# Patient Record
Sex: FEMALE | Race: WHITE | HISPANIC OR LATINO
[De-identification: names, ages, dates, MRNs, and addresses within clinical notes are randomized per-mention and may not be internally consistent; named-entity substitution may affect disease eponyms.]

---

## 2022-07-11 ENCOUNTER — APPOINTMENT (OUTPATIENT)
Dept: ORTHOPEDIC SURGERY | Facility: CLINIC | Age: 57
End: 2022-07-11

## 2022-07-11 DIAGNOSIS — E78.00 PURE HYPERCHOLESTEROLEMIA, UNSPECIFIED: ICD-10-CM

## 2022-07-11 DIAGNOSIS — S92.341A DISPLACED FRACTURE OF FOURTH METATARSAL BONE, RIGHT FOOT, INITIAL ENCOUNTER FOR CLOSED FRACTURE: ICD-10-CM

## 2022-07-11 DIAGNOSIS — S92.351A DISPLACED FRACTURE OF FIFTH METATARSAL BONE, RIGHT FOOT, INITIAL ENCOUNTER FOR CLOSED FRACTURE: ICD-10-CM

## 2022-07-11 NOTE — PHYSICAL EXAM
[Moderate] : moderate swelling of lateral foot [4th] : 4th [NL (40)] : plantar flexion 40 degrees [5___] : dorsiflexion 5[unfilled]/5 [4___] : inversion 4[unfilled]/5 [3___] : eversion 3[unfilled]/5 [2+] : posterior tibialis pulse: 2+ [Normal] : saphenous nerve sensation normal [Right] : right foot [] : non-antalgic [de-identified] : Comminuted 4/5 metatarsal fractures in acceptable alignment; no change from prior xray. [de-identified] : inversion 20 degrees [de-identified] : eversion 15 degrees [TWNoteComboBox7] : dorsiflexion 15 degrees

## 2022-07-11 NOTE — HISTORY OF PRESENT ILLNESS
[de-identified] : Pt is a 56 year old female who twisted her right foot on 7/10/22.  She went to [facility] MD and was diagnosed with 4th/5th metatarsal fractures.  She has been pwb in a post op shoe with crutches since.  No prior right foot problems. [] : Post Surgical Visit: no [FreeTextEntry1] : R foot

## 2022-07-11 NOTE — ASSESSMENT
[FreeTextEntry1] : Patient was told that she has fractures.\par She is to strictly nwb in a cam walker.  Rx was given today.\par Ice/nsaids prn.\par Possible need for surgery was discussed if alignment changes.

## 2022-07-11 NOTE — DATA REVIEWED
[Outside X-rays] : outside x-rays [Right] : of the right [Foot] : foot [I reviewed the films/CD] : I reviewed the films/CD [FreeTextEntry1] : Comminuted fractures of 4/5 metatarsals.  Acceptable displacement noted.

## 2022-07-11 NOTE — RETURN TO WORK/SCHOOL
[FreeTextEntry1] : The patient can return to work on 7/14/22.  She is not permitted to drive.  She is not allowed to bear weight on the right foot.

## 2022-07-18 ENCOUNTER — APPOINTMENT (OUTPATIENT)
Dept: ORTHOPEDIC SURGERY | Facility: CLINIC | Age: 57
End: 2022-07-18

## 2022-07-18 DIAGNOSIS — Z00.00 ENCOUNTER FOR GENERAL ADULT MEDICAL EXAMINATION W/OUT ABNORMAL FINDINGS: ICD-10-CM

## 2022-07-18 NOTE — ASSESSMENT
[FreeTextEntry1] : Fractures are stable.\par No surgical indication at this time.\par Continue NWB in CAM boot, the importance of compliance stressed to patient.\par \par Patient was instructed that they can not operate an automatic vehicle while wearing a CAM boot or cast on the right lower extremity. If operating a vehicle that requires use of a clutch, patient may not drive while wearing a CAM boot or cast on the left lower extremity.\par \par Repeat x-ray will be performed at the next office visit.\par \par

## 2022-07-18 NOTE — HISTORY OF PRESENT ILLNESS
[de-identified] : Pt. presents for follow up of her right 4th/5th metatarsal fractures from 7/10/22.  She has been nwb in cam boot, using roll-about scooter.  [] : Post Surgical Visit: no [FreeTextEntry1] : R foot

## 2022-07-18 NOTE — PHYSICAL EXAM
[4th] : 4th [NL (40)] : plantar flexion 40 degrees [2+] : posterior tibialis pulse: 2+ [Normal] : saphenous nerve sensation normal [Right] : right foot [Mild] : mild swelling of lateral foot [5th] : 5th [NL (20)] : dorsiflexion 20 degrees [NL 30)] : inversion 30 degrees [5___] : inversion 5[unfilled]/5 [4___] : eversion 4[unfilled]/5 [] : no pain when stressing lateral tarsal metatarsal joint [de-identified] : Comminuted 4/5 metatarsal fractures in acceptable alignment; no change from prior xray. [de-identified] : inversion 20 degrees [de-identified] : eversion 15 degrees [TWNoteComboBox7] : dorsiflexion 15 degrees

## 2022-08-01 ENCOUNTER — TRANSCRIPTION ENCOUNTER (OUTPATIENT)
Age: 57
End: 2022-08-01

## 2022-08-01 ENCOUNTER — APPOINTMENT (OUTPATIENT)
Dept: ORTHOPEDIC SURGERY | Facility: CLINIC | Age: 57
End: 2022-08-01

## 2022-08-01 NOTE — ASSESSMENT
[FreeTextEntry1] : Fractures are stable.\par No surgical indication at this time.\par Continue NWB in CAM boot, the importance of compliance stressed to patient.\par Ice/nsaids prn. \par \par \par Repeat x-ray will be performed at the next office visit.\par \par

## 2022-08-01 NOTE — PHYSICAL EXAM
[Mild] : mild swelling of lateral foot [5th] : 5th [NL (40)] : plantar flexion 40 degrees [NL 30)] : inversion 30 degrees [2+] : posterior tibialis pulse: 2+ [Normal] : saphenous nerve sensation normal [Right] : right foot [NL (20)] : eversion 20 degrees [5___] : eversion 5[unfilled]/5 [] : no pain when stressing lateral tarsal metatarsal joint [The fracture is in acceptable alignment. There is progression in healing seen] : The fracture is in acceptable alignment. There is progression in healing seen [de-identified] : Comminuted 4/5 metatarsal fractures in acceptable alignment; no change in alignment from prior xray.  Mild increase in healing. [de-identified] : eversion 0 degrees

## 2022-08-01 NOTE — HISTORY OF PRESENT ILLNESS
[de-identified] : Pt. presents for follow up of her right 4th/5th metatarsal fractures from 7/10/22.  She has been nwb in cam boot, using roll-about scooter.   She admits to falling on that foot on 7/20/22.  She has no pain at this time. [] : Post Surgical Visit: no [FreeTextEntry1] : R foot

## 2022-08-22 ENCOUNTER — APPOINTMENT (OUTPATIENT)
Dept: ORTHOPEDIC SURGERY | Facility: CLINIC | Age: 57
End: 2022-08-22

## 2022-08-22 VITALS — HEIGHT: 65 IN | BODY MASS INDEX: 24.99 KG/M2 | WEIGHT: 150 LBS

## 2022-08-22 NOTE — PHYSICAL EXAM
[Mild] : mild swelling of lateral foot [NL (40)] : plantar flexion 40 degrees [NL 30)] : inversion 30 degrees [NL (20)] : eversion 20 degrees [5___] : eversion 5[unfilled]/5 [2+] : posterior tibialis pulse: 2+ [Normal] : saphenous nerve sensation normal [Right] : right foot [The fracture is in acceptable alignment. There is progression in healing seen] : The fracture is in acceptable alignment. There is progression in healing seen [] : no pain when stressing lateral tarsal metatarsal joint [de-identified] : Comminuted 4/5 metatarsal fractures in acceptable alignment; no change in alignment from prior xray.

## 2022-08-22 NOTE — HISTORY OF PRESENT ILLNESS
[0] : 0 [de-identified] : Pt. presents for follow up of her right 4th/5th metatarsal fractures from 7/10/22.  She has been nwb in cam boot, using roll-about scooter.   She reports doing better. No pain at this time. [] : Post Surgical Visit: no [FreeTextEntry1] : R foot

## 2022-09-16 ENCOUNTER — APPOINTMENT (OUTPATIENT)
Dept: ORTHOPEDIC SURGERY | Facility: CLINIC | Age: 57
End: 2022-09-16

## 2022-09-16 NOTE — HISTORY OF PRESENT ILLNESS
[0] : 0 [de-identified] : Pt. presents for follow up of her right 4th/5th metatarsal fractures from 7/10/22.  She has been intermittently wb in cam boot, using roll-about scooter.   She reports no pain at this time. [] : Post Surgical Visit: no [FreeTextEntry1] : R foot

## 2022-09-16 NOTE — PHYSICAL EXAM
[NL (40)] : plantar flexion 40 degrees [NL 30)] : inversion 30 degrees [NL (20)] : eversion 20 degrees [2+] : posterior tibialis pulse: 2+ [Normal] : saphenous nerve sensation normal [Right] : right foot [The fracture is in acceptable alignment. There is progression in healing seen] : The fracture is in acceptable alignment. There is progression in healing seen [5___] : [facility] 5[unfilled]/5 [] : no pain when stressing lateral tarsal metatarsal joint [de-identified] : Comminuted 4/5 metatarsal fractures in acceptable alignment; no change in alignment from prior xray.  4th metatarsal is healed; 5th is improved

## 2022-09-16 NOTE — ASSESSMENT
[FreeTextEntry1] : Patient is doing well and no longer needs the cam walker\par No high impact activities\par Ice/nsaids prn\par \par Repeat x-ray will be performed at the next office visit.\par \par

## 2022-10-14 ENCOUNTER — APPOINTMENT (OUTPATIENT)
Dept: ORTHOPEDIC SURGERY | Facility: CLINIC | Age: 57
End: 2022-10-14

## 2022-10-14 DIAGNOSIS — S92.351D DISPLACED FRACTURE OF FIFTH METATARSAL BONE, RIGHT FOOT, SUBSEQUENT ENCOUNTER FOR FRACTURE WITH ROUTINE HEALING: ICD-10-CM

## 2022-10-14 DIAGNOSIS — S92.341D DISPLACED FRACTURE OF FOURTH METATARSAL BONE, RIGHT FOOT, SUBSEQUENT ENCOUNTER FOR FRACTURE WITH ROUTINE HEALING: ICD-10-CM

## 2022-10-14 NOTE — HISTORY OF PRESENT ILLNESS
[0] : 0 [de-identified] : Pt. presents for follow up of her right 4th/5th metatarsal fractures from 7/10/22.  She has been wb in supportive shoes.  No real pain at this time.  [] : Post Surgical Visit: no [FreeTextEntry1] : R foot

## 2022-10-14 NOTE — ASSESSMENT
[FreeTextEntry1] : Fractures are healed.\par Patient has recovered well, and can slowly resume activities as tolerated.  Home stretching exercises were encouraged to maintain flexibility. Ice/nsaids can be used as needed. Patient will up as needed.\par \par \par

## 2022-10-14 NOTE — PHYSICAL EXAM
[NL (40)] : plantar flexion 40 degrees [NL 30)] : inversion 30 degrees [NL (20)] : eversion 20 degrees [5___] : [facility] 5[unfilled]/5 [2+] : posterior tibialis pulse: 2+ [Normal] : saphenous nerve sensation normal [Right] : right foot [The fracture is in acceptable alignment. There is progression in healing seen] : The fracture is in acceptable alignment. There is progression in healing seen [] : non-antalgic [de-identified] : Healed 4th and 5th metatarsal fractures.

## 2023-04-26 ENCOUNTER — NON-APPOINTMENT (OUTPATIENT)
Age: 58
End: 2023-04-26

## 2023-04-28 ENCOUNTER — NON-APPOINTMENT (OUTPATIENT)
Age: 58
End: 2023-04-28

## 2023-04-28 ENCOUNTER — APPOINTMENT (OUTPATIENT)
Dept: ORTHOPEDIC SURGERY | Facility: CLINIC | Age: 58
End: 2023-04-28
Payer: COMMERCIAL

## 2023-04-28 VITALS — WEIGHT: 154 LBS | BODY MASS INDEX: 25.66 KG/M2 | HEIGHT: 65 IN

## 2023-04-28 DIAGNOSIS — M25.512 PAIN IN LEFT SHOULDER: ICD-10-CM

## 2023-04-28 DIAGNOSIS — G89.29 PAIN IN RIGHT SHOULDER: ICD-10-CM

## 2023-04-28 DIAGNOSIS — G89.29 PAIN IN LEFT SHOULDER: ICD-10-CM

## 2023-04-28 DIAGNOSIS — M25.511 PAIN IN RIGHT SHOULDER: ICD-10-CM

## 2023-04-28 RX ORDER — EVOLOCUMAB 140 MG/ML
140 INJECTION, SOLUTION SUBCUTANEOUS
Refills: 0 | Status: ACTIVE | COMMUNITY

## 2023-04-28 NOTE — PHYSICAL EXAM
[de-identified] : General Exam\par \par Well developed, well nourished\par No apparent distress\par Oriented to person, place, and time\par Mood: Normal\par Affect: Normal\par Balance and coordination: Normal\par Gait: Normal\par \par Left shoulder exam\par \par Inspection: No swelling, ecchymosis or gross deformity.\par Skin: No masses, No lesions\par Tenderness: No bicipital tenderness, no tenderness to the greater tuberosity/RTC insertion, no anterior shoulder/lesser tuberosity tenderness. No tenderness SC joint, clavicle, AC joint.\par ROM: 160/60/T6\par Impingement tests: Positive Willson\par AC Joint: no pain with cross arm testing\par Biceps: Negative speed\par Strength: 5/5 abduction, external rotation, and internal rotation\par Neuro: AIN, PIN, Ulnar nerve motor intact\par Sensation: Intact to light touch in radial, median, ulnar, and axillary nerve distributions\par Vasc: 2+ radial pulse\par \par Right shoulder exam\par \par Inspection: No swelling, ecchymosis or gross deformity.\par Skin: No masses, No lesions\par Tenderness: No bicipital tenderness, no tenderness to the greater tuberosity/RTC insertion, no anterior shoulder/lesser tuberosity tenderness. No tenderness SC joint, clavicle, AC joint.\par ROM: 160/60/T6\par Impingement tests: Positive Willson\par AC Joint: no pain with cross arm testing\par Biceps: Negative speed\par Strength: 5/5 abduction, external rotation, and internal rotation\par Neuro: AIN, PIN, Ulnar nerve motor intact\par Sensation: Intact to light touch in radial, median, ulnar, and axillary nerve distributions\par Vasc: 2+ radial pulse\par  [de-identified] : \par The following radiographs were ordered and read by me during this patients visit. I reviewed each radiograph in detail with the patient and discussed the findings as highlighted below. \par 3 views both shoulders] were obtained today that show no fracture, dislocation. There is no degenerative change seen. There is no malalignment. No obvious osseous abnormality. Otherwise unremarkable.

## 2023-04-28 NOTE — DISCUSSION/SUMMARY
[de-identified] : Bilateral shoulder impingement rotator cuff tendinopathy.  We discussed diagnostic and treatment options at length.  She requested injections today\par \par Injection: Right shoulder (Subacromial).\par Indication: Impingement.\par \par A discussion was had with the patient regarding this procedure and all questions were answered. All risks, benefits and alternatives were discussed. These included but were not limited to bleeding, infection, and allergic reaction. Alcohol was used to clean the skin, and betadine was used to sterilize and prep the area in the posterior aspect of the right shoulder. Ethyl chloride spray was then used as a topical anesthetic. A 21-gauge needle was used to inject 4cc of 1% lidocaine and 1cc of 40mg/ml methylprednisolone into the right subacromial space. A sterile bandage was then applied. The patient tolerated the procedure well and there were no complications. \par \par Injection: Left shoulder (Subacromial).\par Indication: Impingement.\par \par A discussion was had with the patient regarding this procedure and all questions were answered. All risks, benefits and alternatives were discussed. These included but were not limited to bleeding, infection, and allergic reaction. Alcohol was used to clean the skin, and betadine was used to sterilize and prep the area in the posterior aspect of the left shoulder. Ethyl chloride spray was then used as a topical anesthetic. A 21-gauge needle was used to inject 4cc of 1% lidocaine and 1cc of 40mg/ml methylprednisolone into the left subacromial space. A sterile bandage was then applied. The patient tolerated the procedure well and there were no complications. \par \par Physical therapy home exercise program follow-up as needed.  If not improved consider advanced imaging with MRI.

## 2023-04-28 NOTE — HISTORY OF PRESENT ILLNESS
[de-identified] : Pt. is a 57 yr old F with Bilateral chronic right worse than left shoulder pain for 2 years. Pt. denies any traumatic FALLON. Pt. states she experiences occasional tingling from her shoulder to the elbow. Pt. states her shoulder pain in deep in the joint. Pt. has difficulty reaching above her head and behind her back. Pt. came in to discuss treatment options. \par \par The patient's past medical history, past surgical history, medications, allergies, and social history were reviewed by me today with the patient and documented accordingly. In addition, the patient's family history, which is noncontributory to this visit, was also reviewed.\par

## 2023-08-02 ENCOUNTER — APPOINTMENT (OUTPATIENT)
Dept: ORTHOPEDIC SURGERY | Facility: CLINIC | Age: 58
End: 2023-08-02
Payer: COMMERCIAL

## 2023-08-02 VITALS — HEIGHT: 65 IN | BODY MASS INDEX: 26.49 KG/M2 | WEIGHT: 159 LBS

## 2023-08-02 DIAGNOSIS — M77.12 LATERAL EPICONDYLITIS, RIGHT ELBOW: ICD-10-CM

## 2023-08-02 DIAGNOSIS — M77.11 LATERAL EPICONDYLITIS, RIGHT ELBOW: ICD-10-CM

## 2023-08-02 RX ORDER — MELOXICAM 7.5 MG/1
7.5 TABLET ORAL DAILY
Qty: 30 | Refills: 0 | Status: ACTIVE | COMMUNITY
Start: 2023-08-02 | End: 1900-01-01

## 2023-08-02 NOTE — HISTORY OF PRESENT ILLNESS
[de-identified] : Pt. is a 57 yr old F with Bilateral chronic elbow pain, aggravated three weeks ago after doing lots of household chores. She was initially seen in April 2023 for bilateral shoulders.  She describes Left> Right lateral elbow pain.  She has shooting pain down the left arm.  Denies numbness or tingling in the fingers.   Pt. denies any traumatic FALLON. Pt. states she experiences occasional tingling from her shoulder to the elbow. Pt. states her shoulder pain in deep in the joint. Pt. has difficulty reaching above her head and behind her back. Pt. came in to discuss treatment options.   The patient's past medical history, past surgical history, medications, allergies, and social history were reviewed by me today with the patient and documented accordingly. In addition, the patient's family history, which is noncontributory to this visit, was also reviewed.

## 2023-08-02 NOTE — DISCUSSION/SUMMARY
[de-identified] : Bilateral lateral epicondylitis I discussed with the patient the treatment of lateral epicondylitis. I discussed that this is a degenerative condition rather than an inflammatory process. The process is reversible, and the best source of treatment is to reduce the offending repetitive overuse injury process. I counseled the patient how to do this. In addition, treatment includes counterforce bracing, physical therapy for stretching and strengthening, and the use of injection therapy (steroids/PRP etc). I also discussed the role of surgical intervention for may become a chronic condition.  Given prescription for Mobic side effects discussed Physical therapy home exercise program follow-up as needed.  If not improved consider advanced imaging with MRI.

## 2023-08-02 NOTE — PHYSICAL EXAM
[de-identified] : General Exam  Well developed, well nourished No apparent distress Oriented to person, place, and time Mood: Normal Affect: Normal Balance and coordination: Normal Gait: Normal  Bilateral elbow exam  ·	Skin: Clean, dry, intact. No ecchymosis. No swelling. No palpable joint effusion. No gross deformity. ·	Tenderness: No tenderness to palpation medial epicondyle, + ttp lateral epicondyle, no tenderness olecranon, radial head. ·	ROM: 0 to 140 degrees bilateral full pronation full supination.  Pain with wrist and middle finger extension bilateral ·	Stability: Stable to vaus/valgus stress ·	Neuro: Negative tinels at ulnar canal, AIN/PIN/Ulnar nerve in tact to motor/sensation. ·	Strength: 5/5 elbow flexion, 5/5 elbow extension, 5/5 supination, 5/5 pronation ·	Sensation: In tact to light touch throughout ·	Vasc: 2+ radial pulse, <2s cap refill  [de-identified] : The following radiographs were ordered and read by me during this patients visit. I reviewed each radiograph in detail with the patient and discussed the findings as highlighted below.  3 views both elbows were obtained today that show no fracture, dislocation. There is no degenerative change seen. There is no malalignment. No obvious osseous abnormality. Otherwise unremarkable.

## 2024-01-29 ENCOUNTER — APPOINTMENT (OUTPATIENT)
Dept: ORTHOPEDIC SURGERY | Facility: CLINIC | Age: 59
End: 2024-01-29
Payer: COMMERCIAL

## 2024-01-29 VITALS — HEIGHT: 65 IN | BODY MASS INDEX: 26.49 KG/M2 | WEIGHT: 159 LBS

## 2024-01-29 DIAGNOSIS — M77.12 LATERAL EPICONDYLITIS, LEFT ELBOW: ICD-10-CM

## 2024-01-29 NOTE — ASSESSMENT
[FreeTextEntry1] : The patient was advised of the diagnosis. The natural history of the pathology was explained in full to the patient in layman's terms. All questions were answered. The risks and benefits of surgical and non-surgical treatment alternatives were explained in full to the patient. We discussed treatment options including nsaids, topical gels, tennis elbow strap, PT, activity modification, cortisone inj, sx .pt is aware that symptoms usually resolve on their own in 95-99% of people, but the timeframe is unknown. Home exercises/stretches were demonstrated and the patient practiced them as well- They are to do the exercises hourly and hold the stretch for 30 seconds.  Avoid repetitive wrist flexion time was spent instructing the patient on appropriate placement of the elbow strap as well.   An injection of a tendon origin was performed of the left lateral epicondyle. The indication for this procedure was pain and inflammation. The site was prepped with alcohol and ethyl chloride sprayed topically. An injection of Lidocaine 3cc of 1% , Bupivacaine (Marcaine) 3cc of 0.25% , Triamcinolone (Kenalog) 2cc of 40 mg  was used. Patient was advised to call if redness, pain or fever occur and apply ice for 15 minutes out of every hour for the next 12-24 hours as tolerated. The risks benefits, and alternatives have been discussed, and verbal consent was obtained.  Pt was given CSI in left lateral epicondyle Start activity modification and HEP. F/u in 6 weeks

## 2024-01-29 NOTE — IMAGING
[de-identified] : full active range of motion of the left shoulder, wrist and fingers full active range of motion of the left elbow Tenderness to palpation of the lateral epicondyle and proximal extensor supinator mass pain with resisted wrist extension and forearm supination 4/5 strength in supination and wrist extension, otherwise 5/5 strength median/ulnar/radial sensation intact to light touch ain/pin/ulnar motor intact palpable pulses CR<2s

## 2024-01-29 NOTE — HISTORY OF PRESENT ILLNESS
[8] : 8 [Dull/Aching] : dull/aching [Constant] : constant [Nothing helps with pain getting better] : Nothing helps with pain getting better [de-identified] : 1/29/24:  Pt reports that she has had intermittent pain in her left elbow for about 2 years.  Pt was given CSI in left lateral epicondyle 6 months ago that alleviated the pain for 3 months. [] : no [FreeTextEntry1] : L elbow [FreeTextEntry5] : pain for a year. seen PM-CSI no relief [de-identified] : activity

## 2024-08-26 ENCOUNTER — APPOINTMENT (OUTPATIENT)
Dept: ORTHOPEDIC SURGERY | Facility: CLINIC | Age: 59
End: 2024-08-26
Payer: COMMERCIAL

## 2024-08-26 DIAGNOSIS — S92.512A DISPLACED FRACTURE OF PROXIMAL PHALANX OF LEFT LESSER TOE(S), INITIAL ENCOUNTER FOR CLOSED FRACTURE: ICD-10-CM

## 2024-08-26 RX ORDER — NAPROXEN 500 MG/1
500 TABLET ORAL
Qty: 60 | Refills: 2 | Status: ACTIVE | COMMUNITY
Start: 2024-08-26 | End: 1900-01-01

## 2024-08-26 NOTE — PHYSICAL EXAM
[Moderate] : moderate swelling of toe(s) [4th] : 4th [NL (40)] : plantar flexion 40 degrees [NL 30)] : inversion 30 degrees [NL (20)] : eversion 20 degrees [5___] : [facility] 5[unfilled]/5 [2+] : posterior tibialis pulse: 2+ [Normal] : saphenous nerve sensation normal [] : patient ambulates without assistive device [Left] : left foot [de-identified] : Mildly displaced 4th proximal phalanx fracture in good alignment.

## 2024-08-26 NOTE — DISCUSSION/SUMMARY
[de-identified] : Patient has an acceptably aligned toe fracture.  WBAT in hard sole shoe. Ice to affected area. Matthieu taping toes. Naprosyn prescribed. The patient was explained the options as well as benefits of over the counter verses prescription strength nonsteroidal anti-inflammatory medication. Prescription strength medication is recommended to suppress chronic inflammation. The patient opts for a prescription strength medication.  Repeat x-ray will be performed at the next office visit

## 2024-08-29 ENCOUNTER — APPOINTMENT (OUTPATIENT)
Dept: ORTHOPEDIC SURGERY | Facility: CLINIC | Age: 59
End: 2024-08-29
Payer: COMMERCIAL

## 2024-08-29 DIAGNOSIS — M77.12 LATERAL EPICONDYLITIS, LEFT ELBOW: ICD-10-CM

## 2024-08-29 NOTE — HISTORY OF PRESENT ILLNESS
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   1/29/24:  Pt reports that she has had intermittent pain in her left elbow for about 2 years.  Pt was given CSI in left lateral epicondyle 6 months ago that alleviated the pain for 3 months. [] : no [FreeTextEntry1] : L elbow [FreeTextEntry5] : pain for a year. seen PM-CSI no relief [de-identified] : activity

## 2024-08-29 NOTE — ASSESSMENT
[FreeTextEntry1] : The patient was advised of the diagnosis. The natural history of the pathology was explained in full to the patient in layman's terms. All questions were answered. The risks and benefits of surgical and non-surgical treatment alternatives were explained in full to the patient. We discussed treatment options including nsaids, topical gels, tennis elbow strap, PT, activity modification, cortisone inj, sx .pt is aware that symptoms usually resolve on their own in 95-99% of people, but the timeframe is unknown. Home exercises/stretches were demonstrated and the patient practiced them as well- They are to do the exercises hourly and hold the stretch for 30 seconds.  Avoid repetitive wrist flexion time was spent instructing the patient on appropriate placement of the elbow strap as well.   An injection of a tendon origin was performed of the left lateral epicondyle. The indication for this procedure was pain and inflammation. The site was prepped with alcohol and ethyl chloride sprayed topically. An injection of Lidocaine 3cc of 1% , Bupivacaine (Marcaine) 3cc of 0.25% , Triamcinolone (Kenalog) 2cc of 40 mg  was used. Patient was advised to call if redness, pain or fever occur and apply ice for 15 minutes out of every hour for the next 12-24 hours as tolerated. The risks benefits, and alternatives have been discussed, and verbal consent was obtained.

## 2024-08-29 NOTE — IMAGING
[de-identified] : full active range of motion of the left shoulder, wrist and fingers full active range of motion of the left elbow Tenderness to palpation of the lateral epicondyle and proximal extensor supinator mass pain with resisted wrist extension and forearm supination 4/5 strength in supination and wrist extension, otherwise 5/5 strength median/ulnar/radial sensation intact to light touch ain/pin/ulnar motor intact palpable pulses CR<2s

## 2024-09-23 ENCOUNTER — APPOINTMENT (OUTPATIENT)
Dept: ORTHOPEDIC SURGERY | Facility: CLINIC | Age: 59
End: 2024-09-23
Payer: COMMERCIAL

## 2024-09-23 VITALS — HEIGHT: 65 IN | BODY MASS INDEX: 26.49 KG/M2 | WEIGHT: 159 LBS

## 2024-09-23 DIAGNOSIS — S92.512D DISPLACED FRACTURE OF PROXIMAL PHALANX OF LEFT LESSER TOE(S), SUBSEQUENT ENCOUNTER FOR FRACTURE WITH ROUTINE HEALING: ICD-10-CM

## 2024-09-23 NOTE — DISCUSSION/SUMMARY
[de-identified] : Patient has an acceptably aligned toe fracture.  Continue with tavon taping and using a  hard sole shoe. The fifth toe is stable an does not need any additional treatment. Repeat x-ray will be performed at the next office visit

## 2024-09-23 NOTE — PHYSICAL EXAM
[4th] : 4th [NL (40)] : plantar flexion 40 degrees [NL 30)] : inversion 30 degrees [NL (20)] : eversion 20 degrees [5___] : [facility] 5[unfilled]/5 [2+] : posterior tibialis pulse: 2+ [Normal] : saphenous nerve sensation normal [Mild] : mild swelling of toe(s) [] : no pain when stressing lateral tarsal metatarsal joint [Left] : left toe [de-identified] : Mildly displaced 4th proximal phalanx fracture with some early callus. No change in alignment from last visit.  Non displaced 5th proximal phalanx fracture seen today which was not seen on prior xray.

## 2024-09-23 NOTE — HISTORY OF PRESENT ILLNESS
[Gradual] : gradual [5] : 5 [3] : 3 [Dull/Aching] : dull/aching [Intermittent] : intermittent [Rest] : rest [Walking] : walking [Stairs] : stairs [de-identified] : Patient returns for her left 4th toe fracture from 8/25/24. She has been tavon taping and reports feeling much better. [] : no [FreeTextEntry1] : left foot

## 2024-10-07 ENCOUNTER — APPOINTMENT (OUTPATIENT)
Dept: ORTHOPEDIC SURGERY | Facility: CLINIC | Age: 59
End: 2024-10-07
Payer: COMMERCIAL

## 2024-10-10 ENCOUNTER — APPOINTMENT (OUTPATIENT)
Dept: ORTHOPEDIC SURGERY | Facility: CLINIC | Age: 59
End: 2024-10-10
Payer: COMMERCIAL

## 2024-10-10 DIAGNOSIS — M77.12 LATERAL EPICONDYLITIS, LEFT ELBOW: ICD-10-CM

## 2024-10-25 ENCOUNTER — NON-APPOINTMENT (OUTPATIENT)
Age: 59
End: 2024-10-25

## 2025-02-11 ENCOUNTER — APPOINTMENT (OUTPATIENT)
Dept: ORTHOPEDIC SURGERY | Facility: CLINIC | Age: 60
End: 2025-02-11
Payer: COMMERCIAL

## 2025-02-11 DIAGNOSIS — M77.12 LATERAL EPICONDYLITIS, LEFT ELBOW: ICD-10-CM

## 2025-02-11 DIAGNOSIS — M77.11 LATERAL EPICONDYLITIS, RIGHT ELBOW: ICD-10-CM

## 2025-03-01 ENCOUNTER — NON-APPOINTMENT (OUTPATIENT)
Age: 60
End: 2025-03-01

## 2025-06-25 ENCOUNTER — APPOINTMENT (OUTPATIENT)
Dept: ORTHOPEDIC SURGERY | Facility: CLINIC | Age: 60
End: 2025-06-25

## 2025-07-02 ENCOUNTER — APPOINTMENT (OUTPATIENT)
Dept: ORTHOPEDIC SURGERY | Facility: CLINIC | Age: 60
End: 2025-07-02
Payer: COMMERCIAL

## 2025-07-02 VITALS — BODY MASS INDEX: 26.66 KG/M2 | WEIGHT: 160 LBS | HEIGHT: 65 IN

## 2025-07-02 PROCEDURE — 99214 OFFICE O/P EST MOD 30 MIN: CPT | Mod: 25

## 2025-07-02 PROCEDURE — 20610 DRAIN/INJ JOINT/BURSA W/O US: CPT | Mod: LT

## 2025-07-02 PROCEDURE — 99204 OFFICE O/P NEW MOD 45 MIN: CPT | Mod: 25

## 2025-07-02 PROCEDURE — J3490M: CUSTOM

## 2025-07-14 ENCOUNTER — APPOINTMENT (OUTPATIENT)
Dept: ORTHOPEDIC SURGERY | Facility: CLINIC | Age: 60
End: 2025-07-14
Payer: COMMERCIAL

## 2025-07-14 PROCEDURE — 20551 NJX 1 TENDON ORIGIN/INSJ: CPT | Mod: LT

## 2025-07-14 PROCEDURE — J3490M: CUSTOM

## 2025-07-14 PROCEDURE — 99214 OFFICE O/P EST MOD 30 MIN: CPT | Mod: 25

## 2025-07-23 ENCOUNTER — APPOINTMENT (OUTPATIENT)
Dept: ORTHOPEDIC SURGERY | Facility: CLINIC | Age: 60
End: 2025-07-23
Payer: COMMERCIAL

## 2025-07-23 DIAGNOSIS — M17.12 UNILATERAL PRIMARY OSTEOARTHRITIS, LEFT KNEE: ICD-10-CM

## 2025-07-23 PROCEDURE — 99214 OFFICE O/P EST MOD 30 MIN: CPT

## 2025-07-23 RX ORDER — DICLOFENAC SODIUM 75 MG/1
75 TABLET, DELAYED RELEASE ORAL TWICE DAILY
Qty: 60 | Refills: 1 | Status: ACTIVE | COMMUNITY
Start: 2025-07-23 | End: 1900-01-01